# Patient Record
Sex: FEMALE | Race: WHITE | NOT HISPANIC OR LATINO | Employment: FULL TIME | URBAN - METROPOLITAN AREA
[De-identification: names, ages, dates, MRNs, and addresses within clinical notes are randomized per-mention and may not be internally consistent; named-entity substitution may affect disease eponyms.]

---

## 2023-08-26 ENCOUNTER — OFFICE VISIT (OUTPATIENT)
Age: 63
End: 2023-08-26
Payer: COMMERCIAL

## 2023-08-26 VITALS
BODY MASS INDEX: 25.61 KG/M2 | SYSTOLIC BLOOD PRESSURE: 124 MMHG | DIASTOLIC BLOOD PRESSURE: 80 MMHG | OXYGEN SATURATION: 98 % | RESPIRATION RATE: 20 BRPM | TEMPERATURE: 98.2 F | WEIGHT: 150 LBS | HEART RATE: 80 BPM | HEIGHT: 64 IN

## 2023-08-26 DIAGNOSIS — S40.861A INSECT BITE OF RIGHT UPPER ARM WITH LOCAL REACTION, INITIAL ENCOUNTER: Primary | ICD-10-CM
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
## 2023-08-26 PROCEDURE — 99203 OFFICE O/P NEW LOW 30 MIN: CPT | Performed by: PHYSICIAN ASSISTANT

## 2023-08-26 RX ORDER — FAMOTIDINE 40 MG/1
40 TABLET, FILM COATED ORAL
COMMUNITY
Start: 2023-08-03

## 2023-08-26 RX ORDER — CEPHALEXIN 500 MG/1
500 CAPSULE ORAL EVERY 8 HOURS SCHEDULED
Qty: 21 CAPSULE | Refills: 0 | Status: SHIPPED | OUTPATIENT
Start: 2023-08-26 | End: 2023-09-02

## 2023-08-26 RX ORDER — PREDNISONE 10 MG/1
TABLET ORAL
Qty: 24 TABLET | Refills: 0 | Status: SHIPPED | OUTPATIENT
Start: 2023-08-26

## 2023-08-26 RX ORDER — TRIAMCINOLONE ACETONIDE 1 MG/G
CREAM TOPICAL 2 TIMES DAILY
Qty: 45 G | Refills: 0 | Status: SHIPPED | OUTPATIENT
Start: 2023-08-26

## 2023-08-26 NOTE — PATIENT INSTRUCTIONS
Insect Bite or Sting   WHAT YOU NEED TO KNOW:   Most insect bites and stings are not dangerous and go away without treatment. Your symptoms may be mild, or you may develop anaphylaxis. Anaphylaxis is a sudden, life-threatening reaction that needs immediate treatment. Common examples of insects that bite or sting are bees, ticks, mosquitoes, spiders, and ants. Insect bites or stings can lead to diseases such as malaria, West Nile virus, Lyme disease, or [place] Spotted Fever. DISCHARGE INSTRUCTIONS:   Call your local emergency number (911 in the [address]) for signs or symptoms of anaphylaxis,  such as trouble breathing, swelling in your mouth or throat, or wheezing. You may also have itching, a rash, hives, or feel like you are going to faint. Return to the emergency department if:   You are stung on your tongue or in your throat. A white area forms around the bite. You are sweating badly or have body pain. You think you were bitten or stung by a poisonous insect. Call your doctor if:   You have a fever. The area becomes red, warm, tender, and swollen beyond the area of the bite or sting. You have questions or concerns about your condition or care. Medicines: You may need any of the following:  Antihistamines  decrease itching and rash. Epinephrine  is used to treat severe allergic reactions such as anaphylaxis. Take your medicine as directed. Contact your healthcare provider if you think your medicine is not helping or if you have side effects. Tell your provider if you are allergic to any medicine. Keep a list of the medicines, vitamins, and herbs you take. Include the amounts, and when and why you take them. Bring the list or the pill bottles to follow-up visits. Carry your medicine list with you in case of an emergency. Steps to take for signs or symptoms of anaphylaxis:   Immediately  give 1 shot of epinephrine only into the outer thigh muscle.          Leave the shot in place as directed. Your healthcare provider may recommend you leave it in place for up to 10 seconds before you remove it. This helps make sure all of the epinephrine is delivered. Call 911 and go to the emergency department,  even if the shot improved symptoms. Do not drive yourself. Bring the used epinephrine shot with you. Safety precautions to take if you are at risk for anaphylaxis:   Keep 2 shots of epinephrine with you at all times. You may need a second shot, because epinephrine only works for about 20 minutes and symptoms may return. Your healthcare provider can show you and family members how to give the shot. Check the expiration date every month and replace it before it expires. Create an action plan. Your healthcare provider can help you create a written plan that explains the allergy and an emergency plan to treat a reaction. The plan explains when to give a second epinephrine shot if symptoms return or do not improve after the first. Give copies of the action plan and emergency instructions to family members, work and school staff, and  providers. Show them how to give a shot of epinephrine. Carry medical alert identification. Wear medical alert jewelry or carry a card that says you have an insect allergy. Ask your healthcare provider where to get these items. If an insect bites or stings you:   Remove the stinger. Scrape the stinger out with your fingernail, edge of a credit card, or a knife blade. Do not squeeze the wound. Gently wash the area with soap and water. Remove the tick. Ticks must be removed as soon as possible so you do not get diseases passed through tick bites. Ask your healthcare provider for more information on tick bites and how to remove ticks. Care for a bite or sting wound:   Elevate (raise) the area above the level of your heart, if possible. Prop the area on pillows to keep it raised comfortably.  Elevate the area for 10 to 20 minutes each hour or as directed by your healthcare provider. Use compresses. Soak a clean washcloth in cold water, wring it out, and put it on the bite or sting. Use the compress for 10 to 20 minutes each hour or as directed by your healthcare provider. After 24 to 48 hours, change to warm compresses. Apply a paste. Add water to baking soda to make a thick paste. Put the paste on the area for 5 minutes. Rinse gently to remove the paste. Prevent another insect bite or sting:   Do not wear bright-colored or flower-print clothing when you plan to spend time outdoors. Do not use hairspray, perfumes, or aftershave. Do not leave food out. Empty any standing water and wash container with soap and water every 2 days. Put screens on all open windows and doors. Put insect repellent that contains DEET on skin that is showing when you go outside. Put insect repellent at the top of your boots, bottom of pant legs, and sleeve cuffs. Wear long sleeves, pants, and shoes. Use citronella candles outdoors to help keep mosquitoes away. Put a tick and flea collar on pets. Follow up with your doctor as directed:  Write down your questions so you remember to ask them during your visits. © Copyright [employer] 2022 Information is for End User's use only and may not be sold, redistributed or otherwise used for commercial purposes. The above information is an  only. It is not intended as medical advice for individual conditions or treatments. Talk to your doctor, nurse or pharmacist before following any medical regimen to see if it is safe and effective for you.

## 2023-08-30 NOTE — PROGRESS NOTES
[facility]        NAME: Luann Mustafa is a 61 y.o. female  : 1960    MRN: [id number]  DATE: 2023  TIME: 12:18 PM    Assessment and Plan   Insect bite of right upper arm with local reaction, initial encounter [S40.861A, W57. XXXA]  1. Insect bite of right upper arm with local reaction, initial encounter  predniSONE 10 mg tablet    triamcinolone (KENALOG) 0.1 % cream    cephalexin (KEFLEX) 500 mg capsule            Patient Instructions     Patient has insect bite reaction on her right arm. This has persisted for almost a week. I prescribed her an oral prednisone taper as well as a topical steroid cream.  Also recommended antihistamines for itching. I did give her a prescription for Keflex which she may fill if condition does not significantly improve over the next 2 to 3 days on the steroid or if condition worsens. Follow up with PCP in 3-5 days. Proceed to  ER if symptoms worsen. Chief Complaint     Chief Complaint   Patient presents with   • Insect Bite     C/o red, swollen area on right arm x 5-6 days. Pt. Thinks she got a spider bite last . Pt. Did a virtual visit wed was given two days of prednisone. History of Present Illness       Patient presents almost 1 week after suffering insect bite on her right arm. She believes that she was bitten by a spider. She did a virtual visit last week and was given 2 days worth of prednisone which she took and her condition started to improve but did not resolve. She is concerned about the redness and swelling around the wound. She reports itching but no significant pain. Denies fever, chills, or other symptoms. Review of Systems   Review of Systems   Constitutional: Negative. Respiratory: Negative. Cardiovascular: Negative. Gastrointestinal: Negative. Genitourinary: Negative. Musculoskeletal: Negative. Skin: Positive for color change and wound. Insect bite right arm   Neurological: Negative. Current Medications       Current Outpatient Medications:   •  cephalexin (KEFLEX) 500 mg capsule, Take 1 capsule (500 mg total) by mouth every 8 (eight) hours for 7 days, Disp: 21 capsule, Rfl: 0  •  predniSONE 10 mg tablet, 2-9-0-4-3-2-1 taper with food. , Disp: 24 tablet, Rfl: 0  •  triamcinolone (KENALOG) 0.1 % cream, Apply topically 2 (two) times a day, Disp: 45 g, Rfl: 0  •  famotidine (PEPCID) 40 MG tablet, Take 40 mg by mouth daily at bedtime, Disp: , Rfl:     Current Allergies     Allergies as of 08/26/2023 - Reviewed 08/26/2023   Allergen Reaction Noted   • Azithromycin Hives 04/01/2018            The following portions of the patient's history were reviewed and updated as appropriate: allergies, current medications, past family history, past medical history, past social history, past surgical history and problem list.     Past Medical History:   Diagnosis Date   • GERD (gastroesophageal reflux disease)        History reviewed. No pertinent surgical history. History reviewed. No pertinent family history. Medications have been verified. Objective   /80 (BP Location: Left arm, Patient Position: Sitting)   Pulse 80   Temp 98.2 °F (36.8 °C)   Resp 20   Ht 5' 4" (1.626 m)   Wt 68 kg (150 lb)   SpO2 98%   BMI 25.75 kg/m²   No LMP recorded. Physical Exam     Physical Exam  Vitals reviewed. Constitutional:       General: She is not in acute distress. Appearance: She is well-developed. Musculoskeletal:         General: Normal range of motion. Skin:     Comments: Right arm antecubital fossa with large localized area of erythema and soft tissue swelling with mildly increased warmth to the touch. There is no sign of abscess formation. No red streaking proximal to area of concern. Neurological:      Mental Status: She is alert and oriented to person, place, and time. Sensory: No sensory deficit.